# Patient Record
Sex: MALE | Race: OTHER | HISPANIC OR LATINO | URBAN - METROPOLITAN AREA
[De-identification: names, ages, dates, MRNs, and addresses within clinical notes are randomized per-mention and may not be internally consistent; named-entity substitution may affect disease eponyms.]

---

## 2017-02-13 ENCOUNTER — EMERGENCY (EMERGENCY)
Facility: HOSPITAL | Age: 62
LOS: 1 days | Discharge: HOME | End: 2017-02-13
Attending: EMERGENCY MEDICINE | Admitting: EMERGENCY MEDICINE
Payer: COMMERCIAL

## 2017-02-13 VITALS
RESPIRATION RATE: 16 BRPM | HEART RATE: 79 BPM | OXYGEN SATURATION: 100 % | TEMPERATURE: 98 F | DIASTOLIC BLOOD PRESSURE: 80 MMHG | SYSTOLIC BLOOD PRESSURE: 124 MMHG

## 2017-02-13 PROCEDURE — 99283 EMERGENCY DEPT VISIT LOW MDM: CPT

## 2017-02-13 RX ORDER — DICLOFENAC SODIUM 75 MG/1
50 TABLET, DELAYED RELEASE ORAL ONCE
Qty: 0 | Refills: 0 | Status: COMPLETED | OUTPATIENT
Start: 2017-02-13 | End: 2017-02-13

## 2017-02-13 RX ORDER — DICLOFENAC SODIUM 75 MG/1
1 TABLET, DELAYED RELEASE ORAL
Qty: 30 | Refills: 0 | OUTPATIENT
Start: 2017-02-13 | End: 2017-03-15

## 2017-02-13 RX ORDER — LIDOCAINE 4 G/100G
1 CREAM TOPICAL ONCE
Qty: 0 | Refills: 0 | Status: COMPLETED | OUTPATIENT
Start: 2017-02-13 | End: 2017-02-13

## 2017-02-13 RX ADMIN — LIDOCAINE 1 PATCH: 4 CREAM TOPICAL at 11:44

## 2017-02-13 RX ADMIN — DICLOFENAC SODIUM 50 MILLIGRAM(S): 75 TABLET, DELAYED RELEASE ORAL at 11:43

## 2017-02-13 NOTE — ED PROVIDER NOTE - MEDICAL DECISION MAKING DETAILS
62 y/o M pt w/ chronic back pain. No red flags. Will not prescribe narcotics. Instruct to f/u w/ pain management in 2 days.

## 2017-02-13 NOTE — ED PROVIDER NOTE - CHPI ED SYMPTOMS NEG
no bowel dysfunction/no bladder dysfunction/no fever, no chills, no CP, no SOB, no cough, no nausea, no vomiting, no weakness

## 2017-02-13 NOTE — ED PROVIDER NOTE - NS ED MD SCRIBE ATTENDING SCRIBE SECTIONS
PAST MEDICAL/SURGICAL/SOCIAL HISTORY/HISTORY OF PRESENT ILLNESS/DISPOSITION/REVIEW OF SYSTEMS/PHYSICAL EXAM/VITAL SIGNS( Pullset)/HIV

## 2017-02-13 NOTE — ED PROVIDER NOTE - OBJECTIVE STATEMENT
60 y/o M pt w/ PMHx Arthritis, Herniated disc and bulging disc c/o right lower back pain w/ numbness/tingling of feet. Also c/o difficulty urinating, which he claims is chronic. Pt states that he ran out of his Percocet prescription from pain management. Called pain management, but was told he would not be able to refill his prescription until 2/15/17. Coming in to ED today for severe back pain. Did not take any other pain medication for pain control. Received recent MRI. Also notes 70 pounds within a year. Denies fever, chills, CP, SOB, cough, nausea, vomiting, weakness, urinary/bowel incontinence or any other complaints. NKDA.

## 2017-02-13 NOTE — ED ADULT TRIAGE NOTE - CHIEF COMPLAINT QUOTE
Pt c/o lower back pain, states he has herniated discs, usually takes Percocet but ran out and next doctor appointment is not for 2 days.   PMH: cardiac stent

## 2019-05-21 PROBLEM — M19.90 UNSPECIFIED OSTEOARTHRITIS, UNSPECIFIED SITE: Chronic | Status: ACTIVE | Noted: 2017-02-13

## 2019-05-21 PROBLEM — Z00.00 ENCOUNTER FOR PREVENTIVE HEALTH EXAMINATION: Status: ACTIVE | Noted: 2019-05-21

## 2019-05-21 PROBLEM — M54.9 DORSALGIA, UNSPECIFIED: Chronic | Status: ACTIVE | Noted: 2017-02-13

## 2019-06-06 ENCOUNTER — APPOINTMENT (OUTPATIENT)
Dept: ORTHOPEDIC SURGERY | Facility: CLINIC | Age: 64
End: 2019-06-06
Payer: COMMERCIAL

## 2019-06-13 ENCOUNTER — APPOINTMENT (OUTPATIENT)
Dept: ORTHOPEDIC SURGERY | Facility: CLINIC | Age: 64
End: 2019-06-13
Payer: COMMERCIAL

## 2019-06-13 VITALS
HEIGHT: 68.5 IN | SYSTOLIC BLOOD PRESSURE: 110 MMHG | WEIGHT: 145 LBS | HEART RATE: 66 BPM | BODY MASS INDEX: 21.72 KG/M2 | DIASTOLIC BLOOD PRESSURE: 72 MMHG

## 2019-06-13 DIAGNOSIS — M54.16 RADICULOPATHY, LUMBAR REGION: ICD-10-CM

## 2019-06-13 PROCEDURE — 72100 X-RAY EXAM L-S SPINE 2/3 VWS: CPT

## 2019-06-13 PROCEDURE — 99204 OFFICE O/P NEW MOD 45 MIN: CPT

## 2019-06-13 NOTE — DISCUSSION/SUMMARY
[de-identified] : Patient was seen today for evaluation of bilateral foot numbness, likely etiology is lumbar spine osteoarthritis with herniation/stenosis. Patient has been under the care of a physiatry as, states he has not had x-rays or MRI in several years. He recalls his last epidural injection was approximately 3 months ago. It provided a few weeks of relief. At this time patient had repeat x-ray which shows significant osteoarthritis. Recommend advance imaging with MRI to evaluate for level of herniation/stenosis present in the lumbar spine. Patient was a followup with physiatry or possible spine surgery consultation depending on the MRI results.  Patient appreciates and agrees with current plan.\par \par This note was generated using dragon medical dictation software.  A reasonable effort has been made for proofreading its contents, but typos may still remain.  If there are any questions or points of clarification needed please notify my office.

## 2019-06-13 NOTE — PHYSICAL EXAM
[de-identified] : The following radiographs were ordered and read by me during this patient's visit. I reviewed each radiograph in detail with the patient and discussed the findings as highlighted below. \par \par 2 views of the lumbar spine were obtained today that show degenerative changes and evidence of moderate to severe multilevel osteoarthritis.  No fracture, or dislocation seen at this time. There is no malalignment. No other obvious osseous abnormality. Otherwise unremarkable. [de-identified] : Constitutional: Well-nourished, well-developed, No acute distress\par Respiratory:  Good respiratory effort, no SOB\par Lymphatic: No regional lymphadenopathy, no lymphedema\par Psychiatric: Pleasant and normal affect, alert and oriented x3\par Skin: Clean dry and intact B/L UE/LE\par Musculoskeletal: normal except where as noted in regional exam\par \par Lumbar Spine Exam\par \par APPEARANCE: no marked deformities or malalignment, + loss of lordotic curvature of the lumbosacral spine. no marked deformities. + poor posture\par POSITIVE TENDERNESS: + IL/SI/ST ligs, + TTP of b/l SI joints, + b/l lower lumbar tenderness and spasm noted in erector spinae and quadratus lumborum\par NONTENDER: no bony midline tenderness.\par ROM: Mildly limited in all directions, mildly painful at end range of flexion and extension\par RESISTIVE TESTING: painless 5/5 resisted flex/ext, sidebending b/l, and rotation\par SPECIAL TESTS: neg SLR b/l, neg JULIEN b/l, neg Trendelenburg b/l \par PULSES: 2+ DP/PT pulses\par NEURO:  L1 - S2 intact to sensation and motor, DTRs 2+/4 patella and achilles\par \par B/L Hips: No asymmetry, malalignment, or swelling, Full ROM, 5/5 strength in flexion/ext, IR/ER, Abd/Add, Joints stable\par B/L Knees: No asymmetry, malalignment, or swelling, Full ROM, 5/5 strength in flexion/ext, Joints stable\par B/L Ankles: No asymmetry, malalignment, or swelling, Full ROM, 5/5 strength in DF/PF/Inv/Ev, Joints stable\par \par

## 2019-06-13 NOTE — HISTORY OF PRESENT ILLNESS
[de-identified] : Patient is here for b/l foot numbness. He states that this is a constant problem. He worked for the [employer] for about 7 years and was constantly on his feet. He does not recall any injury to his feet. He does have a history of lumbar herniated discs and currently sees PMR for this issue. He has had epidural injections but has not noticed if this has any affect on the N/T that he feels. He has done nothing else to treat this pain.

## 2019-06-17 ENCOUNTER — OTHER (OUTPATIENT)
Age: 64
End: 2019-06-17

## 2019-06-26 ENCOUNTER — OTHER (OUTPATIENT)
Age: 64
End: 2019-06-26

## 2023-10-01 PROBLEM — M54.16 LUMBAR RADICULAR PAIN: Status: ACTIVE | Noted: 2019-06-13
